# Patient Record
Sex: MALE | Race: WHITE | NOT HISPANIC OR LATINO | URBAN - METROPOLITAN AREA
[De-identification: names, ages, dates, MRNs, and addresses within clinical notes are randomized per-mention and may not be internally consistent; named-entity substitution may affect disease eponyms.]

---

## 2018-03-16 ENCOUNTER — IMPORTED ENCOUNTER (OUTPATIENT)
Dept: URBAN - METROPOLITAN AREA CLINIC 1 | Facility: CLINIC | Age: 68
End: 2018-03-16

## 2018-03-16 PROBLEM — H16.143: Noted: 2018-03-16

## 2018-03-16 PROBLEM — H01.001: Noted: 2018-03-16

## 2018-03-16 PROBLEM — H04.123: Noted: 2018-03-16

## 2018-03-16 PROBLEM — H25.813: Noted: 2018-03-16

## 2018-03-16 PROBLEM — H40.013: Noted: 2018-03-16

## 2018-03-16 PROBLEM — H35.042: Noted: 2018-03-16

## 2018-03-16 PROBLEM — H01.004: Noted: 2018-03-16

## 2018-03-16 PROCEDURE — 92014 COMPRE OPH EXAM EST PT 1/>: CPT

## 2018-03-16 NOTE — PATIENT DISCUSSION
1.  Cataract OU: Observe. 2.  JOSE w/ PEK OU- Cont ATs TID OU Routinely. 3.  Glaucoma Suspect OU : (CD 0.7/0.75) Past w/u negative. IOP stable on no meds. Cont to observe. Patient is considered Low Risk. 4.  MA OS no DM history H/o HTN per patient- observe. 5.  Anterior Blepharitis OU - Begin Daily warm compresses and lid scrubs were recommended. Return for an appointment in 6 mo 10 dfe glare with Dr. Tiff Najera.

## 2018-09-17 ENCOUNTER — IMPORTED ENCOUNTER (OUTPATIENT)
Dept: URBAN - METROPOLITAN AREA CLINIC 1 | Facility: CLINIC | Age: 68
End: 2018-09-17

## 2018-09-17 PROBLEM — H35.042: Noted: 2018-09-17

## 2018-09-17 PROBLEM — H01.002: Noted: 2018-09-17

## 2018-09-17 PROBLEM — H16.143: Noted: 2018-09-17

## 2018-09-17 PROBLEM — H01.005: Noted: 2018-09-17

## 2018-09-17 PROBLEM — H40.013: Noted: 2018-09-17

## 2018-09-17 PROBLEM — H04.123: Noted: 2018-09-17

## 2018-09-17 PROBLEM — H25.813: Noted: 2018-09-17

## 2018-09-17 PROCEDURE — 92012 INTRM OPH EXAM EST PATIENT: CPT

## 2018-09-17 PROCEDURE — 92015 DETERMINE REFRACTIVE STATE: CPT

## 2018-09-17 NOTE — PATIENT DISCUSSION
1.  Cataract OU:  Visually Significant secondary to glare discussed the risks benefits alternatives and limitations of cataract surgery. The patient stated a full understanding and a desire to proceed with the procedure. The patient will need to start pre-operative eye drops as directed. Proceed w/ phaco PCL OS then ODOtherwise follow-up in 6 months for a 30/glare2. Glaucoma Suspect OU (0.7/0.75): Stable IOP and C/D OU. Past w/u (-). Patient is considered Low Risk. Condition was discussed with patient and patient understands. Will continue to monitor patient for any progression in condition. Patient was advised to call us with any problems questions or concerns. 3.  JOSE w/ PEK OU- Stable. The continuation of artificial tears were recommended. 4.  Blepharitis anterior type OU - Daily warm compresses and lid scrubs were recommended. 5.  MA OS- no DM history H/o HTN per patient- observe. 6. Return for an appointment for [facility] H&P with Dr. Malika Yeh.

## 2018-09-24 ENCOUNTER — IMPORTED ENCOUNTER (OUTPATIENT)
Dept: URBAN - METROPOLITAN AREA CLINIC 1 | Facility: CLINIC | Age: 68
End: 2018-09-24

## 2018-09-24 PROBLEM — H25.812: Noted: 2018-09-24

## 2018-09-24 PROCEDURE — 92136 OPHTHALMIC BIOMETRY: CPT

## 2018-09-24 NOTE — PATIENT DISCUSSION
1. Cataract OS:  Visually Significant secondary to glare discussed the risks benefits alternatives and limitations of cataract surgery. The patient stated a full understanding and a desire to proceed with the procedure. Pt understands they will need glasses post-op to achieve their best corrected vision. Phaco PCL OS (Standard lens standard procedure)Return for an appointment for Return as scheduled with Dr. Cammie Calvin.

## 2018-10-03 ENCOUNTER — IMPORTED ENCOUNTER (OUTPATIENT)
Dept: URBAN - METROPOLITAN AREA CLINIC 1 | Facility: CLINIC | Age: 68
End: 2018-10-03

## 2018-10-04 ENCOUNTER — IMPORTED ENCOUNTER (OUTPATIENT)
Dept: URBAN - METROPOLITAN AREA CLINIC 1 | Facility: CLINIC | Age: 68
End: 2018-10-04

## 2018-10-04 PROBLEM — Z96.1: Noted: 2018-10-04

## 2018-10-04 PROCEDURE — 99024 POSTOP FOLLOW-UP VISIT: CPT

## 2018-10-09 ENCOUNTER — IMPORTED ENCOUNTER (OUTPATIENT)
Dept: URBAN - METROPOLITAN AREA CLINIC 1 | Facility: CLINIC | Age: 68
End: 2018-10-09

## 2018-10-09 PROBLEM — Z96.1: Noted: 2018-10-09

## 2018-10-09 PROBLEM — H25.811: Noted: 2018-10-09

## 2018-10-09 PROCEDURE — 92136 OPHTHALMIC BIOMETRY: CPT

## 2018-10-09 NOTE — PATIENT DISCUSSION
1. POW#1  CE/IOL OS doing well. Discontinue OcufloxContinue Lotemax/Durezol/Prednisolone BID until gone. Continue Prolensa/Ilevro/Acular QD until gone. 2. Cataract OD: Visually Significant secondary to glare discussed the risks benefits alternatives and limitations of cataract surgery. The patient stated a full understanding and a desire to proceed with the procedure. Start pre-operative eye drops as directed. Return for an appointment in [address] [facility] with Dr. Tiff Najera.

## 2018-10-09 NOTE — PATIENT DISCUSSION
Cataract OD: Visually Significant discussed the risks benefits alternatives and limitations of cataract surgery. The patient stated a full understanding and a desire to proceed with the procedure. The patient will need to return for preop appointment with cataract measurements and to have any additional questions answered and start pre-operative eye drops as directed. Phaco PCLOtherwise follow-up

## 2018-10-17 ENCOUNTER — IMPORTED ENCOUNTER (OUTPATIENT)
Dept: URBAN - METROPOLITAN AREA CLINIC 1 | Facility: CLINIC | Age: 68
End: 2018-10-17

## 2018-10-18 ENCOUNTER — IMPORTED ENCOUNTER (OUTPATIENT)
Dept: URBAN - METROPOLITAN AREA CLINIC 1 | Facility: CLINIC | Age: 68
End: 2018-10-18

## 2018-10-18 PROBLEM — Z96.1: Noted: 2018-10-18

## 2018-10-18 PROCEDURE — 99024 POSTOP FOLLOW-UP VISIT: CPT

## 2018-10-18 NOTE — PATIENT DISCUSSION
1. POD#1 Phaco/ PCL Standard OD- doing well. Continue all 3 gtts as prescribed and until gone. Lotemax BID OD Prolensa Qdaily OD Ocuflox TID OD  Post op Warnings Reiterated 2. POW#2 Phaco/ PCL Standard OS- doing well Continue gtts as prescribed and until gone.  Lotemax BID OS Prolensa Qdaily OSPost op Warnings Reiterated RTC as scheduled

## 2018-11-12 ENCOUNTER — IMPORTED ENCOUNTER (OUTPATIENT)
Dept: URBAN - METROPOLITAN AREA CLINIC 1 | Facility: CLINIC | Age: 68
End: 2018-11-12

## 2018-11-12 PROBLEM — Z96.1: Noted: 2018-11-12

## 2018-11-12 PROCEDURE — 99024 POSTOP FOLLOW-UP VISIT: CPT

## 2018-11-12 NOTE — PATIENT DISCUSSION
1. POM#1 CE/IOL OU doing well. Continue Lotemax BID until gone. Continue IlevroQD until gone. 2. Return for an appointment for 30/OCT in 6 months with Dr. Kera Gilmore.

## 2019-05-14 ENCOUNTER — IMPORTED ENCOUNTER (OUTPATIENT)
Dept: URBAN - METROPOLITAN AREA CLINIC 1 | Facility: CLINIC | Age: 69
End: 2019-05-14

## 2019-05-14 PROBLEM — H40.013: Noted: 2019-05-14

## 2019-05-14 PROBLEM — H01.001: Noted: 2019-05-14

## 2019-05-14 PROBLEM — H01.004: Noted: 2019-05-14

## 2019-05-14 PROCEDURE — 92014 COMPRE OPH EXAM EST PT 1/>: CPT

## 2019-05-14 PROCEDURE — 92133 CPTRZD OPH DX IMG PST SGM ON: CPT

## 2019-05-14 NOTE — PATIENT DISCUSSION
1.  Glaucoma Suspect OU : (CD 0.7/0.75) Past w/u negative. IOP stable on no meds. OCT shows minimal thinning OU but essentially normal.  Cont to observe. Patient is considered Low Risk. 2.  Anterior Blepharitis OU - Cont Daily Hot compresses and lid scrubs were recommended. 2. JOSE w/ PEK OU- Cont ATs TID OU Routinely. 4.  MA OS no DM history H/o HTN per patient- observe. [facility] given per patient request Return for an appointment in 1 yr 27 OCT with Dr. Víctor Vaughan.

## 2020-05-12 ENCOUNTER — IMPORTED ENCOUNTER (OUTPATIENT)
Dept: URBAN - METROPOLITAN AREA CLINIC 1 | Facility: CLINIC | Age: 70
End: 2020-05-12

## 2020-05-12 PROBLEM — H01.001: Noted: 2020-05-12

## 2020-05-12 PROBLEM — H04.123: Noted: 2020-05-12

## 2020-05-12 PROBLEM — H16.143: Noted: 2020-05-12

## 2020-05-12 PROBLEM — H40.023: Noted: 2020-05-12

## 2020-05-12 PROBLEM — H01.004: Noted: 2020-05-12

## 2020-05-12 PROBLEM — Z96.1: Noted: 2020-05-12

## 2020-05-12 PROBLEM — H35.042: Noted: 2020-05-12

## 2020-05-12 PROCEDURE — 92133 CPTRZD OPH DX IMG PST SGM ON: CPT

## 2020-05-12 PROCEDURE — 92014 COMPRE OPH EXAM EST PT 1/>: CPT

## 2020-05-12 NOTE — PATIENT DISCUSSION
1.  Glaucoma Suspect OU (CD 0.7/0.75) IOP stable on no meds. OCT minimal thinning OU with slight progression. Cont to observe. Patient considered High Risk. 2.  MA OS -- no DM history H/o HTN per patient. Observe. 3.  Anterior Blepharitis OU - Daily Hot compresses and lid scrubs were recommended. 4. JOSE w/ PEK OU- Cont ATs TID OU Routinely. 5. Pseudophakia OU - Standard OU. Doing well. Patient defers the refraction at today's visit    Return for an appointment in 1 year 30/OCT with Dr. Antonio Zuluaga.

## 2021-05-17 ENCOUNTER — IMPORTED ENCOUNTER (OUTPATIENT)
Dept: URBAN - METROPOLITAN AREA CLINIC 1 | Facility: CLINIC | Age: 71
End: 2021-05-17

## 2021-05-17 PROBLEM — H35.043: Noted: 2021-05-17

## 2021-05-17 PROBLEM — H16.143: Noted: 2021-05-17

## 2021-05-17 PROBLEM — H04.123: Noted: 2021-05-17

## 2021-05-17 PROCEDURE — 99214 OFFICE O/P EST MOD 30 MIN: CPT

## 2021-05-17 PROCEDURE — 92015 DETERMINE REFRACTIVE STATE: CPT

## 2021-05-17 PROCEDURE — 92133 CPTRZD OPH DX IMG PST SGM ON: CPT

## 2021-05-17 NOTE — PATIENT DISCUSSION
1.  Glaucoma Suspect OU -- (CD 0.7/0.75) IOP stable on no meds. OCT minimal thinning OU no progression. Cont to observe. Patient considered High Risk. 2.  Micro aneurysm OU -- Patient needs to be evaluated for DM and if negative recommend Carotid Duplex as these signs may be related to retinal ischemia from carotid occlusive disease. 3.  JOSE w/ PEK OU -- Compliance with ATs TID OU Routinely were recommended. 4.  Anterior Blepharitis OU -- Daily Hot compresses and lid scrubs were recommended. 5. Pseudophakia OU -- Standard OU. Doing well. Finalized Glasses MRx today. Return for an appointment in 6 months for a 10/DFE/Mac Photo with Dr. Tiff Najera.

## 2021-11-02 ENCOUNTER — IMPORTED ENCOUNTER (OUTPATIENT)
Dept: URBAN - METROPOLITAN AREA CLINIC 1 | Facility: CLINIC | Age: 71
End: 2021-11-02

## 2021-11-02 PROBLEM — H35.043: Noted: 2021-11-02

## 2021-11-02 PROCEDURE — 92012 INTRM OPH EXAM EST PATIENT: CPT

## 2021-11-02 PROCEDURE — 92250 FUNDUS PHOTOGRAPHY W/I&R: CPT

## 2021-11-02 NOTE — PATIENT DISCUSSION
1.  Micro aneurysm OU -- Patient states negative for DM however still recommend having carotid duplex scan performed per PCP. Mac photo today showing MA's OU. 2.  Glaucoma Suspect OU -- (CD 0.7/0.75) IOP stable on no meds. Cont to observe. Patient considered High Risk. 3.  JOSE w/ PEK OU -- Compliance with ATs TID OU Routinely were recommended. 4.  Anterior Blepharitis OU -- Daily Hot compresses and lid scrubs were recommended. 5. Pseudophakia OU -- Standard OU. Doing well. Letter to PCP. Return for an appointment in 6 months for a 30/OCT with Dr. Azra Middleton.

## 2022-04-02 ASSESSMENT — TONOMETRY
OS_IOP_MMHG: 15
OS_IOP_MMHG: 12
OD_IOP_MMHG: 13
OS_IOP_MMHG: 12
OD_IOP_MMHG: 12
OS_IOP_MMHG: 13
OS_IOP_MMHG: 14
OD_IOP_MMHG: 14
OD_IOP_MMHG: 13
OD_IOP_MMHG: 17
OS_IOP_MMHG: 14
OS_IOP_MMHG: 13
OD_IOP_MMHG: 14
OD_IOP_MMHG: 12
OS_IOP_MMHG: 14
OD_IOP_MMHG: 13

## 2022-04-02 ASSESSMENT — VISUAL ACUITY
OS_SC: 20/25
OD_GLARE: 20/70
OS_GLARE: 20/50
OS_CC: 20/40
OS_SC: 20/25
OS_CC: 20/60
OS_CC: J1
OS_CC: 20/30-2
OS_GLARE: 20/70
OD_SC: 20/25
OD_SC: 20/20
OD_SC: 20/25
OS_CC: J1
OD_CC: 20/40
OD_CC: 20/40
OD_SC: 20/25
OS_SC: 20/25
OS_CC: 20/40
OD_CC: J1
OD_CC: J1
OS_SC: 20/40
OD_GLARE: 20/50
OD_SC: 20/40
OS_SC: 20/25
OS_SC: 20/30
OD_SC: 20/25

## 2022-04-02 ASSESSMENT — KERATOMETRY
OS_K1POWER_DIOPTERS: 42.75
OD_AXISANGLE2_DEGREES: 091
OS_AXISANGLE_DEGREES: 164
OD_K1POWER_DIOPTERS: 43.00
OS_AXISANGLE2_DEGREES: 074
OS_AXISANGLE2_DEGREES: 086
OS_K2POWER_DIOPTERS: 44.50
OD_AXISANGLE_DEGREES: 001
OD_AXISANGLE2_DEGREES: 149
OD_K1POWER_DIOPTERS: 42.50
OD_K2POWER_DIOPTERS: 43.25
OS_AXISANGLE_DEGREES: 176
OD_AXISANGLE_DEGREES: 059
OD_K2POWER_DIOPTERS: 43.75
OS_K1POWER_DIOPTERS: 43.00
OS_K2POWER_DIOPTERS: 44.50

## 2022-05-05 ENCOUNTER — COMPREHENSIVE EXAM (OUTPATIENT)
Dept: URBAN - METROPOLITAN AREA CLINIC 1 | Facility: CLINIC | Age: 72
End: 2022-05-05

## 2022-05-05 DIAGNOSIS — Z96.1: ICD-10-CM

## 2022-05-05 DIAGNOSIS — H01.021: ICD-10-CM

## 2022-05-05 DIAGNOSIS — H01.024: ICD-10-CM

## 2022-05-05 DIAGNOSIS — H04.123: ICD-10-CM

## 2022-05-05 DIAGNOSIS — H16.143: ICD-10-CM

## 2022-05-05 DIAGNOSIS — H35.043: ICD-10-CM

## 2022-05-05 DIAGNOSIS — H40.023: ICD-10-CM

## 2022-05-05 PROCEDURE — 92133 CPTRZD OPH DX IMG PST SGM ON: CPT

## 2022-05-05 PROCEDURE — 99214 OFFICE O/P EST MOD 30 MIN: CPT

## 2022-05-05 ASSESSMENT — VISUAL ACUITY
OS_CC: 20/30 +1
OD_CC: 20/25 +2

## 2022-05-05 ASSESSMENT — TONOMETRY
OS_IOP_MMHG: 14
OD_IOP_MMHG: 14

## 2022-05-05 NOTE — PATIENT DISCUSSION
(CD 0.70/0.75) No progression by OCT. IOP stable. Patient is considered high risk. Condition was discussed with patient and patient understands. Will continue to monitor patient for any progression in condition. Patient was advised to call us with any problems, questions, or concerns.

## 2023-11-22 ENCOUNTER — COMPREHENSIVE EXAM (OUTPATIENT)
Dept: URBAN - METROPOLITAN AREA CLINIC 1 | Facility: CLINIC | Age: 73
End: 2023-11-22

## 2023-11-22 DIAGNOSIS — H35.043: ICD-10-CM

## 2023-11-22 DIAGNOSIS — H16.143: ICD-10-CM

## 2023-11-22 DIAGNOSIS — Z96.1: ICD-10-CM

## 2023-11-22 DIAGNOSIS — H40.023: ICD-10-CM

## 2023-11-22 DIAGNOSIS — H04.123: ICD-10-CM

## 2023-11-22 PROCEDURE — 99214 OFFICE O/P EST MOD 30 MIN: CPT

## 2023-11-22 PROCEDURE — 92015 DETERMINE REFRACTIVE STATE: CPT

## 2023-11-22 PROCEDURE — 92133 CPTRZD OPH DX IMG PST SGM ON: CPT

## 2023-11-22 ASSESSMENT — VISUAL ACUITY
OD_CC: 20/20
OS_CC: J3
OD_CC: J1
OS_CC: 20/40

## 2023-11-22 ASSESSMENT — TONOMETRY
OS_IOP_MMHG: 10
OD_IOP_MMHG: 10

## 2024-05-09 ENCOUNTER — FOLLOW UP (OUTPATIENT)
Dept: URBAN - METROPOLITAN AREA CLINIC 1 | Facility: CLINIC | Age: 74
End: 2024-05-09

## 2024-05-09 DIAGNOSIS — H40.023: ICD-10-CM

## 2024-05-09 PROCEDURE — 99213 OFFICE O/P EST LOW 20 MIN: CPT

## 2024-05-09 PROCEDURE — 92083 EXTENDED VISUAL FIELD XM: CPT

## 2024-05-09 ASSESSMENT — VISUAL ACUITY
OU_CC: 20/25
OD_CC: 20/30+2
OS_CC: 20/30
OD_PH: 20/25-2

## 2024-05-09 ASSESSMENT — TONOMETRY
OS_IOP_MMHG: 12
OD_IOP_MMHG: 10

## 2024-12-09 ENCOUNTER — COMPREHENSIVE EXAM (OUTPATIENT)
Age: 74
End: 2024-12-09

## 2024-12-09 DIAGNOSIS — H01.024: ICD-10-CM

## 2024-12-09 DIAGNOSIS — H40.023: ICD-10-CM

## 2024-12-09 DIAGNOSIS — H01.021: ICD-10-CM

## 2024-12-09 DIAGNOSIS — Z96.1: ICD-10-CM

## 2024-12-09 DIAGNOSIS — H35.043: ICD-10-CM

## 2024-12-09 PROCEDURE — 92014 COMPRE OPH EXAM EST PT 1/>: CPT

## 2024-12-09 PROCEDURE — 92134 CPTRZ OPH DX IMG PST SGM RTA: CPT

## 2024-12-09 PROCEDURE — 92133 CPTRZD OPH DX IMG PST SGM ON: CPT

## 2024-12-09 PROCEDURE — 92015 DETERMINE REFRACTIVE STATE: CPT

## 2025-06-02 ENCOUNTER — FOLLOW UP (OUTPATIENT)
Age: 75
End: 2025-06-02

## 2025-06-02 DIAGNOSIS — H40.023: ICD-10-CM

## 2025-06-02 DIAGNOSIS — H35.043: ICD-10-CM

## 2025-06-02 PROCEDURE — 92012 INTRM OPH EXAM EST PATIENT: CPT

## 2025-06-02 PROCEDURE — 92083 EXTENDED VISUAL FIELD XM: CPT
